# Patient Record
Sex: FEMALE | Race: WHITE | Employment: FULL TIME
[De-identification: names, ages, dates, MRNs, and addresses within clinical notes are randomized per-mention and may not be internally consistent; named-entity substitution may affect disease eponyms.]

---

## 2017-06-17 ENCOUNTER — HEALTH MAINTENANCE LETTER (OUTPATIENT)
Age: 52
End: 2017-06-17

## 2018-02-27 ENCOUNTER — HOSPITAL ENCOUNTER (OUTPATIENT)
Dept: MAMMOGRAPHY | Facility: CLINIC | Age: 53
Discharge: HOME | End: 2018-02-27
Attending: OBSTETRICS & GYNECOLOGY | Admitting: OBSTETRICS & GYNECOLOGY
Payer: COMMERCIAL

## 2018-02-27 DIAGNOSIS — Z12.31 VISIT FOR SCREENING MAMMOGRAM: ICD-10-CM

## 2018-02-27 PROCEDURE — 77067 SCR MAMMO BI INCL CAD: CPT

## 2018-04-06 ENCOUNTER — TRANSFERRED RECORDS (OUTPATIENT)
Dept: HEALTH INFORMATION MANAGEMENT | Facility: CLINIC | Age: 53
End: 2018-04-06

## 2018-04-24 ENCOUNTER — OFFICE VISIT (OUTPATIENT)
Dept: SURGERY | Facility: CLINIC | Age: 53
End: 2018-04-24
Payer: COMMERCIAL

## 2018-04-24 VITALS
WEIGHT: 158 LBS | BODY MASS INDEX: 23.95 KG/M2 | HEIGHT: 68 IN | SYSTOLIC BLOOD PRESSURE: 112 MMHG | HEART RATE: 64 BPM | DIASTOLIC BLOOD PRESSURE: 76 MMHG

## 2018-04-24 DIAGNOSIS — N60.02 CYST (SOLITARY) OF BREAST, LEFT: Primary | ICD-10-CM

## 2018-04-24 LAB
GRAM STN SPEC: NORMAL
GRAM STN SPEC: NORMAL
SPECIMEN SOURCE: NORMAL

## 2018-04-24 PROCEDURE — 10060 I&D ABSCESS SIMPLE/SINGLE: CPT | Performed by: SURGERY

## 2018-04-24 PROCEDURE — 87205 SMEAR GRAM STAIN: CPT | Performed by: SURGERY

## 2018-04-24 PROCEDURE — 87070 CULTURE OTHR SPECIMN AEROBIC: CPT | Performed by: SURGERY

## 2018-04-24 PROCEDURE — 99204 OFFICE O/P NEW MOD 45 MIN: CPT | Mod: 25 | Performed by: SURGERY

## 2018-04-24 NOTE — PROGRESS NOTES
[facility] Breast Surgery Consultation    HPI:   Laurylee Duscher is a 52 year old female who is seen in consultation at the request of Dr. Capellan for evaluation of left breast erythema and fluctuance. Laurylee reports that at the end of March she noticed a spot on her left upper outer breast that was red and about the size of a pea. She saw Dr. Capellan and was started on keflex. The area got bigger and more erythematous and she was then placed on Augmentin which she finished approximately 7 days ago. The redness improved but there was still a tender spot that was red. She had a screening mammogram Februrary 2018 which was negative. She recently returned from a cruise in the [facility].     Hormonal history:  Post menopausal, hysterectomy and oophorectomy in 2010,  on HRT, no fertility treatment.  2 children, 1st at age 23, menarche 15yo.    Family history of breast cancer: Yes - maternal aunt at 39yo  Family history of ovarian cancer:  No  Family history of colon cancer: Yes - mother in her late 70s  Family history of prostate cancer: Yes - father in his 70s and brother in his 60s    Imaging:     Reviewed mammogram from 2/2018 - negative per report and my review    Percutaneous core needle biopsy, none:       Past Medical History:   has a past medical history of Asthma; Endometriosis; Family history of heart disease; Hyperlipidemia; Ovarian cyst; and Pleurisy (1990).      Current Outpatient Prescriptions:      estradiol (VIVELLE-DOT) 0.05 MG/24HR patch, Place 1 patch onto the skin twice a week, Disp: , Rfl:      Ibuprofen (ADVIL PO), Take by mouth every 8 hours as needed for moderate pain, Disp: , Rfl:      Multiple Vitamins-Minerals (MULTIVITAMIN PO), Take by mouth daily, Disp: , Rfl:      Omega-3 Fatty Acids (OMEGA-3 FISH OIL PO), Take by mouth daily, Disp: , Rfl:      varenicline (CHANTIX STARTING MONTH PAK) 0.5 MG X 11 & 1 MG X 42 tablet, Take 0.5 mg for 4days, then increase to 0.5mg twice a day for days 4-7,  "then 1mg twice daily for 11 weeks. (Patient not taking: Reported on 4/24/2018), Disp: 53 tablet, Rfl: 0     varenicline (CHANTIX) 1 MG tablet, Take 1 tablet (1 mg) by mouth 2 times daily (Patient not taking: Reported on 4/24/2018), Disp: 120 tablet, Rfl: 0    Past Surgical History:  Past Surgical History:   Procedure Laterality Date     APPENDECTOMY  1977     GYN SURGERY      hysterectomy     HYSTERECTOMY  2010    [facility]RIE RSO     TONSILLECTOMY  1976         No Known Allergies      Social History:  Social History     Social History     Marital status:      Spouse name: N/A     Number of children: N/A     Years of education: N/A     Occupational History     Not on file.     Social History Main Topics     Smoking status: Current Every Day Smoker     Packs/day: 1.00     Years: 25.00     Types: Cigarettes     Smokeless tobacco: Never Used     Alcohol use 0.0 oz/week     0 Standard drinks or equivalent per week      Comment: 2-6 drinks, beer, wine, 2 days week     Drug use: Not on file     Sexual activity: Not on file     Other Topics Concern     Caffeine Concern Yes     1 cups coffee per day     Sleep Concern Yes     Stress Concern No     Weight Concern Yes     weight gain     Special Diet Yes     started medifast last week     Exercise Yes     bowling     Social History Narrative        ROS:  The 10 point review of systems is negative other than noted in the HPI and above.    PE:  Vitals: /76  Pulse 64  Ht 5' 8\" (1.727 m)  Wt 158 lb (71.7 kg)  BMI 24.02 kg/m2  General appearance: well-nourished, sitting comfortably, no apparent distress  Psych: normal affect, pleasant  HEENT:  Head normocephalic and atraumatic, pupils equal and round, conjunctivae clear, mucous membranes moist, external ears and nose normal  Neck: Supple without thyromegaly or masses  Lungs: Respirations unlabored  Lymphatic: No cervical, or supraclavicular lymphadenopathy  Extremities: Without edema  Musculoskeletal:  Normal station and " gait  Neurologic: nonfocal, grossly intact times four extremities, alert and oriented times three  Psychiatric: Mood and affect are appropriate  Skin: Without lesions or rashes    Breast:  A bilateral breast exam was performed in the supine position.. Bilateral breasts were palpated in a circumferential clockwise fashion including the supraclavicular and axillary areas.     2cm erythematous skin lesion on the upper outer left breast, near the axilla with fluctuance present, open pore in skin consistent with infected epidermoid cyst. Remainder of breast exam normal, no masses. Nipple/areola normal bilaterally.     Lymph:       No supraclavicular/infraclavicular adenopathy.   Axillary adenopathy: none    Procedure: The left breast skin lesion was prepped with chlorhexidine. 1% lidocaine with epinephrine was injected. A 15 blade was used to make an incision over the area of fluctuance. Cheesy exudate consistent with epidermoid cyst material was evacuated. This was cultured. The wound was irrigated. It was then packed with 1/4 in packing gauze and covered with a sterile dressing.     Assessment/Plan:  Left breast skin lesion clinically consistent with epidermoid cyst. Drained in clinic today. Pt should follow up w me in 2 weeks and we will plan for definitive excision of the cyst in 4-6 weeks under local anesthetic. No need for further abx at this time. Remove packing tomorrow and cover with clean dry dressing.       Chanda Iglesias MD      Please route or send letter to:  Primary Care Provider (PCP) and Referring Provider

## 2018-04-24 NOTE — MR AVS SNAPSHOT
"              After Visit Summary   2018    Laurylee Duscher    MRN: [id number]           Patient Information     Date Of Birth          1965        Visit Information        Provider Department      2018 9:30 AM Chanda Iglesias MD [facility] General Surgery      Today's Diagnoses     Cyst (solitary) of breast, left    -  1       Follow-ups after your visit        Who to contact     If you have questions or need follow up information about today's clinic visit or your schedule please contact [facility] directly at [phone].  Normal or non-critical lab and imaging results will be communicated to you by [employer], letter or phone within 4 business days after the clinic has received the results. If you do not hear from us within 7 days, please contact the clinic through FairShare[employer] or phone. If you have a critical or abnormal lab result, we will notify you by phone as soon as possible.  Submit refill requests through frenting or call your pharmacy and they will forward the refill request to us. Please allow 3 business days for your refill to be completed.          Additional Information About Your Visit        MyChart Information     frenting lets you send messages to your doctor, view your test results, renew your prescriptions, schedule appointments and more. To sign up, go to [email or web address] . Click on \"Log in\" on the left side of the screen, which will take you to the Welcome page. Then click on \"Sign up Now\" on the right side of the page.     You will be asked to enter the access code listed below, as well as some personal information. Please follow the directions to create your username and password.     Your access code is: [email or web address]  Expires: 2018 10:13 AM     Your access code will  in 90 days. If you need help or a new code, please call your [place] clinic or [phone].        Care EveryWhere ID     This is " "your Care EveryWhere ID. This could be used by other organizations to access your [place] medical records  [id number]        Your Vitals Were     Pulse Height BMI (Body Mass Index)             64 5' 8\" (1.727 m) 24.02 kg/m2          Blood Pressure from Last 3 Encounters:   04/24/18 112/76   05/19/16 113/78   11/17/15 118/82    Weight from Last 3 Encounters:   04/24/18 158 lb (71.7 kg)   05/19/16 158 lb (71.7 kg)   11/17/15 164 lb 12.8 oz (74.8 kg)              We Performed the Following     Gram stain     Wound Culture Aerobic Bacterial          Today's Medication Changes          These changes are accurate as of 4/24/18 10:13 AM.  If you have any questions, ask your nurse or doctor.               Stop taking these medicines if you haven't already. Please contact your care team if you have questions.     oxyCODONE IR 5 MG tablet   Commonly known as:  ROXICODONE   Stopped by:  Chanda Iglesias MD                    Primary Care Provider Office Phone # Fax #    Jeanne Capellan [phone]       [facility] [address]  [facility] [address]        Equal Access to Services     GABI ABEBE AH: Hadii lillian tenorioo Sorojelio, waaxda luqadaha, qaybta kaalmada adedialloyada, cecelia abel. So [facility] [phone].    ATENCIÓN: Si habla español, tiene a dominguez disposición servicios gratuitos de asistencia lingüística. Llame al [phone].    We comply with applicable federal civil rights laws and [place] laws. We do not discriminate on the basis of race, color, national origin, age, disability, sex, sexual orientation, or gender identity.            Thank you!     Thank you for choosing [facility]  for your care. Our goal is always to provide you with excellent care. Hearing back from our patients is one way we can continue to improve our services. Please take a few minutes to complete the written survey that you may receive in the mail after your visit " with us. Thank you!             Your Updated Medication List - Protect others around you: Learn how to safely use, store and throw away your medicines at www.disposemymeds.org.          This list is accurate as of 4/24/18 10:13 AM.  Always use your most recent med list.                   Brand Name Dispense Instructions for use Diagnosis    ADVIL PO      Take by mouth every 8 hours as needed for moderate pain        estradiol 0.05 MG/24HR BIW patch    VIVELLE-DOT     Place 1 patch onto the skin twice a week        MULTIVITAMIN PO      Take by mouth daily        OMEGA-3 FISH OIL PO      Take by mouth daily        * varenicline 0.5 MG X 11 & 1 MG X 42 tablet    CHANTIX STARTING MONTH LEONARDA    53 tablet    Take 0.5 mg for 4days, then increase to 0.5mg twice a day for days 4-7, then 1mg twice daily for 11 weeks.    Tobacco abuse       * varenicline 1 MG tablet    CHANTIX    120 tablet    Take 1 tablet (1 mg) by mouth 2 times daily    Tobacco abuse       * Notice:  This list has 2 medication(s) that are the same as other medications prescribed for you. Read the directions carefully, and ask your doctor or other care provider to review them with you.

## 2018-04-24 NOTE — LETTER
[facility] Breast Surgery Consultation    2018    Re: Laurylee Duscher, : 1965     HPI:  Laurylee Duscher is a 52 year old female who is seen in consultation at the request of Dr. Capellan for evaluation of left breast erythema and fluctuance. Laurylee reports that at the end of March she noticed a spot on her left upper outer breast that was red and about the size of a pea. She saw Dr. Capellan and was started on keflex. The area got bigger and more erythematous and she was then placed on Augmentin which she finished approximately 7 days ago. The redness improved but there was still a tender spot that was red. She had a screening mammogram 2018 which was negative. She recently returned from a cruise in the [facility].      Hormonal history:  Post menopausal, hysterectomy and oophorectomy in ,  on HRT, no fertility treatment.  2 children, 1st at age 23, menarche 15yo.     Family history of breast cancer: Yes - maternal aunt at 37yo  Family history of ovarian cancer:  No  Family history of colon cancer: Yes - mother in her late 70s  Family history of prostate cancer: Yes - father in his 70s and brother in his 60s     Imaging:      Reviewed mammogram from 2018 - negative per report and my review     Percutaneous core needle biopsy, none:      Past Medical History:  has a past medical history of Asthma; Endometriosis; Family history of heart disease; Hyperlipidemia; Ovarian cyst; and Pleurisy ().     Current Outpatient Prescriptions:      estradiol (VIVELLE-DOT) 0.05 MG/24HR patch, Place 1 patch onto the skin twice a week, Disp: , Rfl:      Ibuprofen (ADVIL PO), Take by mouth every 8 hours as needed for moderate pain, Disp: , Rfl:      Multiple Vitamins-Minerals (MULTIVITAMIN PO), Take by mouth daily, Disp: , Rfl:      Omega-3 Fatty Acids (OMEGA-3 FISH OIL PO), Take by mouth daily, Disp: , Rfl:      varenicline (CHANTIX STARTING MONTH LEONARDA) 0.5 MG X 11 & 1 MG X 42 tablet, Take 0.5  "mg for 4days, then increase to 0.5mg twice a day for days 4-7, then 1mg twice daily for 11 weeks. (Patient not taking: Reported on 4/24/2018), Disp: 53 tablet, Rfl: 0     varenicline (CHANTIX) 1 MG tablet, Take 1 tablet (1 mg) by mouth 2 times daily (Patient not taking: Reported on 4/24/2018), Disp: 120 tablet, Rfl: 0    PE:  Vitals: /76  Pulse 64  Ht 5' 8\" (1.727 m)  Wt 158 lb (71.7 kg)  BMI 24.02 kg/m2  General appearance: well-nourished, sitting comfortably, no apparent distress  Psych: normal affect, pleasant  HEENT:  Head normocephalic and atraumatic, pupils equal and round, conjunctivae clear, mucous membranes moist, external ears and nose normal  Neck: Supple without thyromegaly or masses  Lungs: Respirations unlabored  Lymphatic: No cervical, or supraclavicular lymphadenopathy  Extremities: Without edema  Musculoskeletal:  Normal station and gait  Neurologic: nonfocal, grossly intact times four extremities, alert and oriented times three  Psychiatric: Mood and affect are appropriate  Skin: Without lesions or rashes     Breast:  A bilateral breast exam was performed in the supine position.. Bilateral breasts were palpated in a circumferential clockwise fashion including the supraclavicular and axillary areas.      2cm erythematous skin lesion on the upper outer left breast, near the axilla with fluctuance present, open pore in skin consistent with infected epidermoid cyst. Remainder of breast exam normal, no masses. Nipple/areola normal bilaterally.      Lymph:                                             No supraclavicular/infraclavicular adenopathy.                        Axillary adenopathy: none     Procedure: The left breast skin lesion was prepped with chlorhexidine. 1% lidocaine with epinephrine was injected. A 15 blade was used to make an incision over the area of fluctuance. Cheesy exudate consistent with epidermoid cyst material was evacuated. This was cultured. The wound was irrigated. It " was then packed with 1/4 in packing gauze and covered with a sterile dressing.      Assessment/Plan:  Left breast skin lesion clinically consistent with epidermoid cyst. Drained in clinic today. Pt should follow up w me in 2 weeks and we will plan for definitive excision of the cyst in 4-6 weeks under local anesthetic. No need for further abx at this time. Remove packing tomorrow and cover with clean dry dressing.         Chanda Iglesias MD

## 2018-04-26 ENCOUNTER — TELEPHONE (OUTPATIENT)
Dept: SURGERY | Facility: CLINIC | Age: 53
End: 2018-04-26

## 2018-04-26 LAB
BACTERIA SPEC CULT: NO GROWTH
SPECIMEN SOURCE: NORMAL

## 2018-04-26 NOTE — TELEPHONE ENCOUNTER
Patient notified, per Dr. Iglesias's progress note, no need for further abx at this time. Patient verbalized understanding.    Encouraged patient to keep f/u with [facility] as scheduled and call us in the interim with additional questions/concerns. Patient verbalized understanding.    Florence Jones RN, BSN  Nurse Navigator   [facility]  [phone]

## 2018-04-26 NOTE — TELEPHONE ENCOUNTER
Name of caller: Patient    Reason for Call:  Pt was told to continued antibiotics, did not receive any and was never prescribed.    Surgeon:  Dr. Iglesias    Recent Surgery:  No    If yes, when & what type:  N/A      Best phone number to reach pt at is: [phone]  Ok to leave a message with medical info? Yes.    Pharmacy preferred (if calling for a refill): [facility] [phone]

## 2018-05-11 ENCOUNTER — OFFICE VISIT (OUTPATIENT)
Dept: SURGERY | Facility: CLINIC | Age: 53
End: 2018-05-11
Payer: COMMERCIAL

## 2018-05-11 ENCOUNTER — TELEPHONE (OUTPATIENT)
Dept: SURGERY | Facility: CLINIC | Age: 53
End: 2018-05-11

## 2018-05-11 VITALS — HEIGHT: 68 IN | WEIGHT: 158 LBS | BODY MASS INDEX: 23.95 KG/M2

## 2018-05-11 DIAGNOSIS — L72.3 SEBACEOUS CYST: Primary | ICD-10-CM

## 2018-05-11 PROCEDURE — 99214 OFFICE O/P EST MOD 30 MIN: CPT | Performed by: SURGERY

## 2018-05-11 NOTE — PROGRESS NOTES
Surgery Follow Up Note    S: Laurylee returns to recheck her left breast sebaceous cyst wound. It has healed well, no infection. No pain.     Left breast: previous location of I&D healed well. There is sebaceous material visible within wound.     A/P  Laurylee Duscher has a left breast skin cyst. She will benefit from formal excision. We will do this in endoscopy on Monday.     Thank you for the opportunity to help in her care.    Chanda Iglesias  [facility]  [phone]    Please route or send letter to:  Primary Care Provider (PCP) and Referring Provider

## 2018-05-11 NOTE — MR AVS SNAPSHOT
"              After Visit Summary   5/11/2018    Laurylee Duscher    MRN: [id number]           Patient Information     Date Of Birth          1965        Visit Information        Provider Department      5/11/2018 4:00 PM Chanda Iglesias MD [facility] General Surgery       Follow-ups after your visit        Your next 10 appointments already scheduled     May 14, 2018   Procedure with Chanda Iglesias MD   [facility] ([facility])    [address]  [place] [id number] [phone]           [facility] is located at [address] [place]              Who to contact     If you have questions or need follow up information about today's clinic visit or your schedule please contact [facility] directly at [phone].  Normal or non-critical lab and imaging results will be communicated to you by MyChart, letter or phone within 4 business days after the clinic has received the results. If you do not hear from us within 7 days, please contact the clinic through MyChart or phone. If you have a critical or abnormal lab result, we will notify you by phone as soon as possible.  Submit refill requests through [employer] or call your pharmacy and they will forward the refill request to us. Please allow 3 business days for your refill to be completed.          Additional Information About Your Visit        MyChart Information     [employer] lets you send messages to your doctor, view your test results, renew your prescriptions, schedule appointments and more. To sign up, go to [email or web address] . Click on \"Log in\" on the left side of the screen, which will take you to the Welcome page. Then click on \"Sign up Now\" on the right side of the page.     You will be asked to enter the access code listed below, as well as some personal information. Please follow the directions to create your username and " "password.     Your access code is: [email or web address]  Expires: 2018 10:13 AM     Your access code will  in 90 days. If you need help or a new code, please call your [facility] or [phone].        Care EveryWhere ID     This is your Care EveryWhere ID. This could be used by other organizations to access your [place] medical records  [id number]        Your Vitals Were     Height BMI (Body Mass Index)                5' 8\" (1.727 m) 24.02 kg/m2           Blood Pressure from Last 3 Encounters:   18 112/76   16 113/78   11/17/15 118/82    Weight from Last 3 Encounters:   18 158 lb (71.7 kg)   18 158 lb (71.7 kg)   16 158 lb (71.7 kg)              Today, you had the following     No orders found for display       Primary Care Provider Office Phone # Fax #    Jeanne Capellan [phone]       [facility] [address]  [facility] [address]        Equal Access to Services     [facility]: Hadii aad ku hadasho Soomaali, waaxda luqadaha, qaybta kaalmada adeegyada, cecelia braga . So [facility] [phone].    ATENCIÓN: Si habla español, tiene a dominguez disposición servicios gratuitos de asistencia lingüística. Llame al [phone].    We comply with applicable federal civil rights laws and [place] laws. We do not discriminate on the basis of race, color, national origin, age, disability, sex, sexual orientation, or gender identity.            Thank you!     Thank you for choosing [facility]  for your care. Our goal is always to provide you with excellent care. Hearing back from our patients is one way we can continue to improve our services. Please take a few minutes to complete the written survey that you may receive in the mail after your visit with us. Thank you!             Your Updated Medication List - Protect others around you: Learn how to safely use, store and throw away your medicines at " www.disposemymeds.org.          This list is accurate as of 5/11/18  4:06 PM.  Always use your most recent med list.                   Brand Name Dispense Instructions for use Diagnosis    ADVIL PO      Take by mouth every 8 hours as needed for moderate pain        estradiol 0.05 MG/24HR BIW patch    VIVELLE-DOT     Place 1 patch onto the skin twice a week        MULTIVITAMIN PO      Take by mouth daily        OMEGA-3 FISH OIL PO      Take by mouth daily        * varenicline 0.5 MG X 11 & 1 MG X 42 tablet    CHANTIX STARTING MONTH LEONARDA    53 tablet    Take 0.5 mg for 4days, then increase to 0.5mg twice a day for days 4-7, then 1mg twice daily for 11 weeks.    Tobacco abuse       * varenicline 1 MG tablet    CHANTIX    120 tablet    Take 1 tablet (1 mg) by mouth 2 times daily    Tobacco abuse       * Notice:  This list has 2 medication(s) that are the same as other medications prescribed for you. Read the directions carefully, and ask your doctor or other care provider to review them with you.

## 2018-05-11 NOTE — LETTER
May 11, 2018    Re: Laurylee Duscher, : 1965    Surgery Follow Up Note     S: Laurylee returns to recheck her left breast sebaceous cyst wound. It has healed well, no infection. No pain.      Left breast: previous location of I&D healed well. There is sebaceous material visible within wound.      A/P  Laurylee Duscher has a left breast skin cyst. She will benefit from formal excision. We will do this in endoscopy on Monday.      Thank you for the opportunity to help in her care.     Chanda Iglesias  [facility]  [phone]

## 2018-05-11 NOTE — TELEPHONE ENCOUNTER
Type of surgery: Excision left breast skin cyst  Location of surgery: [facility]  Date and time of surgery: 5/14/18 at 3:30pm  Surgeon: Dr. Chanda Iglesias  Pre-Op Appt Date: Patient to schedule  Post-Op Appt Date: Patient to schedule   Packet sent out: Yes  Pre-cert/Authorization completed:  Not Applicable  Date: 5/11/18

## 2018-05-14 ENCOUNTER — HOSPITAL ENCOUNTER (OUTPATIENT)
Facility: CLINIC | Age: 53
Discharge: HOME | End: 2018-05-14
Attending: SURGERY | Admitting: SURGERY
Payer: COMMERCIAL

## 2018-05-14 ENCOUNTER — OFFICE VISIT (OUTPATIENT)
Dept: SURGERY | Facility: PHYSICIAN GROUP | Age: 53
End: 2018-05-14
Payer: COMMERCIAL

## 2018-05-14 VITALS
WEIGHT: 158 LBS | HEIGHT: 68 IN | SYSTOLIC BLOOD PRESSURE: 110 MMHG | BODY MASS INDEX: 23.95 KG/M2 | OXYGEN SATURATION: 96 % | DIASTOLIC BLOOD PRESSURE: 79 MMHG

## 2018-05-14 PROCEDURE — 12031 INTMD RPR S/A/T/EXT 2.5 CM/<: CPT

## 2018-05-14 PROCEDURE — 88305 TISSUE EXAM BY PATHOLOGIST: CPT | Performed by: SURGERY

## 2018-05-14 PROCEDURE — 11402 EXC TR-EXT B9+MARG 1.1-2 CM: CPT | Performed by: SURGERY

## 2018-05-14 PROCEDURE — 12031 INTMD RPR S/A/T/EXT 2.5 CM/<: CPT | Performed by: SURGERY

## 2018-05-14 PROCEDURE — 11401 EXC TR-EXT B9+MARG 0.6-1 CM: CPT | Performed by: SURGERY

## 2018-05-14 PROCEDURE — 19120 REMOVAL OF BREAST LESION: CPT | Performed by: SURGERY

## 2018-05-14 PROCEDURE — 88305 TISSUE EXAM BY PATHOLOGIST: CPT | Mod: 26 | Performed by: SURGERY

## 2018-05-14 RX ORDER — LIDOCAINE HYDROCHLORIDE 10 MG/ML
10 INJECTION, SOLUTION EPIDURAL; INFILTRATION; INTRACAUDAL; PERINEURAL ONCE
Status: DISCONTINUED | OUTPATIENT
Start: 2018-05-14 | End: 2018-05-16 | Stop reason: HOSPADM

## 2018-05-16 ENCOUNTER — TELEPHONE (OUTPATIENT)
Dept: SURGERY | Facility: CLINIC | Age: 53
End: 2018-05-16

## 2018-05-16 LAB — COPATH REPORT: NORMAL

## 2018-05-16 NOTE — TELEPHONE ENCOUNTER
I called Laurylee with her pathology results from the skin excision on her left breast. It was a benign lesion consistent with epidermoid inclusion cyst. She should call with questions or concerns.     Chanda Iglesias MD  [facility]  [phone]

## 2018-05-17 ENCOUNTER — TELEPHONE (OUTPATIENT)
Dept: SURGERY | Facility: CLINIC | Age: 53
End: 2018-05-17

## 2018-05-17 NOTE — TELEPHONE ENCOUNTER
I spoke with patient this afternoon and she was just asking if I could tell her what Dr. Iglesias said in her voicemail message she left yesterday.  Patient states she accidentally deleted the message.  I informed patient of Dr. Iglesias's message below:    I called Laurylee with her pathology results from the skin excision on her left breast. It was a benign lesion consistent with epidermoid inclusion cyst. She should call with questions or concerns.      Chanda Iglesias MD  [facility]  [phone]      I also informed patient to continue to monitor the incision for any signs/symptoms of infection, and if any concerns or questions arise, she can call us back.  Patient verbalized understanding.  EARLINE RiveraN, RN

## 2018-05-17 NOTE — TELEPHONE ENCOUNTER
Name of caller: Patient    Reason for Call:  Pt received call from Dr Iglesias and patient deleted the voicemail by mistake.    Surgeon:  Dr. Iglesias    Recent Surgery:  Yes.    If yes, when & what type:  EXCISION LEFT BREAST SKIN CYST 5/14      Best phone number to reach pt at is: [phone]   Ok to leave a message with medical info? Yes.    Pharmacy preferred (if calling for a refill): n/a

## 2018-05-17 NOTE — OP NOTE
General Surgery Operative Note - Minors Procedure    Pre-Operative Diagnosis- left breast skin lesion    Post-Operative Diagnosis- same    Procedure- excision of left breast skin mass    Surgeon- Chanda Iglesias MD    Assistant- None    Anesthesia- 1% lidocaine with epi    Specimen-left breast skin mass    Procedure  Consent was obtained. A surgical time out was performed. The patient was positioned supine. The patient was prepped and draped in the usual sterile fashion with Chloraprep. Using sterile technique, 1% lidocaine with epinephrine was used to infiltrate the area. After adequate anesthesia was confirmed, a number 15 scalpel was used to make an elliptical incision around the lesion. Using sharp dissection a 1.5 cm x 1.5cm mass consistent with epidermoid cyst was found and circumferentially freed.  It was removed in its entirety. There was no bleeding and the wound bed was dry.   The skin was closed in multiple layers with a 3-0 vicryl and 4-0 monocryl. Steri Strips and a sterile dressing were placed.  Patient tolerated the procedure well without complications. All sponge, instrument, and needle counts were correct at the conclusion of the case.      Chanda Iglesias MD  [facility]  [phone]    Please route or send letter to:  Primary Care Provider (PCP) and Referring Provider

## 2019-03-12 ENCOUNTER — HOSPITAL ENCOUNTER (OUTPATIENT)
Dept: MAMMOGRAPHY | Facility: CLINIC | Age: 54
Discharge: HOME | End: 2019-03-12
Attending: OBSTETRICS & GYNECOLOGY | Admitting: OBSTETRICS & GYNECOLOGY
Payer: COMMERCIAL

## 2019-03-12 DIAGNOSIS — Z12.31 VISIT FOR SCREENING MAMMOGRAM: ICD-10-CM

## 2019-03-12 PROCEDURE — 77063 BREAST TOMOSYNTHESIS BI: CPT

## 2019-10-01 ENCOUNTER — HEALTH MAINTENANCE LETTER (OUTPATIENT)
Age: 54
End: 2019-10-01

## 2021-01-15 ENCOUNTER — HEALTH MAINTENANCE LETTER (OUTPATIENT)
Age: 56
End: 2021-01-15

## 2021-05-15 ENCOUNTER — HEALTH MAINTENANCE LETTER (OUTPATIENT)
Age: 56
End: 2021-05-15

## 2021-09-04 ENCOUNTER — HEALTH MAINTENANCE LETTER (OUTPATIENT)
Age: 56
End: 2021-09-04

## 2022-02-19 ENCOUNTER — HEALTH MAINTENANCE LETTER (OUTPATIENT)
Age: 57
End: 2022-02-19

## 2022-10-16 ENCOUNTER — HEALTH MAINTENANCE LETTER (OUTPATIENT)
Age: 57
End: 2022-10-16

## 2023-06-01 ENCOUNTER — HEALTH MAINTENANCE LETTER (OUTPATIENT)
Age: 58
End: 2023-06-01

## 2025-06-10 ENCOUNTER — APPOINTMENT (OUTPATIENT)
Dept: CT IMAGING | Facility: CLINIC | Age: 60
End: 2025-06-10
Attending: EMERGENCY MEDICINE
Payer: COMMERCIAL

## 2025-06-10 ENCOUNTER — HOSPITAL ENCOUNTER (EMERGENCY)
Facility: CLINIC | Age: 60
Discharge: HOME | End: 2025-06-10
Attending: EMERGENCY MEDICINE | Admitting: EMERGENCY MEDICINE
Payer: COMMERCIAL

## 2025-06-10 VITALS
HEIGHT: 64 IN | HEART RATE: 85 BPM | TEMPERATURE: 98 F | OXYGEN SATURATION: 98 % | SYSTOLIC BLOOD PRESSURE: 103 MMHG | DIASTOLIC BLOOD PRESSURE: 71 MMHG | BODY MASS INDEX: 22.2 KG/M2 | RESPIRATION RATE: 16 BRPM | WEIGHT: 130 LBS

## 2025-06-10 DIAGNOSIS — K52.9 COLITIS: ICD-10-CM

## 2025-06-10 LAB
ALBUMIN SERPL BCG-MCNC: 4.1 G/DL (ref 3.5–5.2)
ALP SERPL-CCNC: 56 U/L (ref 40–150)
ALT SERPL W P-5'-P-CCNC: 13 U/L (ref 0–50)
ANION GAP SERPL CALCULATED.3IONS-SCNC: 12 MMOL/L (ref 7–15)
AST SERPL W P-5'-P-CCNC: 15 U/L (ref 0–45)
BASOPHILS # BLD AUTO: 0.1 10E3/UL (ref 0–0.2)
BASOPHILS NFR BLD AUTO: 1 %
BILIRUB SERPL-MCNC: 0.6 MG/DL
BUN SERPL-MCNC: 8.3 MG/DL (ref 8–23)
CALCIUM SERPL-MCNC: 9.4 MG/DL (ref 8.8–10.4)
CHLORIDE SERPL-SCNC: 103 MMOL/L (ref 98–107)
CREAT SERPL-MCNC: 0.7 MG/DL (ref 0.51–0.95)
EGFRCR SERPLBLD CKD-EPI 2021: >90 ML/MIN/1.73M2
EOSINOPHIL # BLD AUTO: 0.1 10E3/UL (ref 0–0.7)
EOSINOPHIL NFR BLD AUTO: 1 %
ERYTHROCYTE [DISTWIDTH] IN BLOOD BY AUTOMATED COUNT: 13.5 % (ref 10–15)
GLUCOSE SERPL-MCNC: 96 MG/DL (ref 70–99)
HCO3 SERPL-SCNC: 23 MMOL/L (ref 22–29)
HCT VFR BLD AUTO: 40.1 % (ref 35–47)
HGB BLD-MCNC: 14.2 G/DL (ref 11.7–15.7)
IMM GRANULOCYTES # BLD: 0 10E3/UL
IMM GRANULOCYTES NFR BLD: 0 %
LYMPHOCYTES # BLD AUTO: 2.9 10E3/UL (ref 0.8–5.3)
LYMPHOCYTES NFR BLD AUTO: 26 %
MCH RBC QN AUTO: 31.4 PG (ref 26.5–33)
MCHC RBC AUTO-ENTMCNC: 35.4 G/DL (ref 31.5–36.5)
MCV RBC AUTO: 89 FL (ref 78–100)
MONOCYTES # BLD AUTO: 0.8 10E3/UL (ref 0–1.3)
MONOCYTES NFR BLD AUTO: 7 %
NEUTROPHILS # BLD AUTO: 7.4 10E3/UL (ref 1.6–8.3)
NEUTROPHILS NFR BLD AUTO: 65 %
NRBC # BLD AUTO: 0 10E3/UL
NRBC BLD AUTO-RTO: 0 /100
PLATELET # BLD AUTO: 295 10E3/UL (ref 150–450)
POTASSIUM SERPL-SCNC: 3.9 MMOL/L (ref 3.4–5.3)
PROT SERPL-MCNC: 6.5 G/DL (ref 6.4–8.3)
RBC # BLD AUTO: 4.52 10E6/UL (ref 3.8–5.2)
SODIUM SERPL-SCNC: 138 MMOL/L (ref 135–145)
WBC # BLD AUTO: 11.4 10E3/UL (ref 4–11)

## 2025-06-10 PROCEDURE — 250N000011 HC RX IP 250 OP 636: Performed by: EMERGENCY MEDICINE

## 2025-06-10 PROCEDURE — 36415 COLL VENOUS BLD VENIPUNCTURE: CPT | Performed by: EMERGENCY MEDICINE

## 2025-06-10 PROCEDURE — 99285 EMERGENCY DEPT VISIT HI MDM: CPT | Mod: 25

## 2025-06-10 PROCEDURE — 250N000009 HC RX 250: Performed by: EMERGENCY MEDICINE

## 2025-06-10 PROCEDURE — 85025 COMPLETE CBC W/AUTO DIFF WBC: CPT | Performed by: EMERGENCY MEDICINE

## 2025-06-10 PROCEDURE — 82435 ASSAY OF BLOOD CHLORIDE: CPT | Performed by: EMERGENCY MEDICINE

## 2025-06-10 PROCEDURE — 250N000013 HC RX MED GY IP 250 OP 250 PS 637: Performed by: EMERGENCY MEDICINE

## 2025-06-10 PROCEDURE — 74177 CT ABD & PELVIS W/CONTRAST: CPT

## 2025-06-10 RX ORDER — ONDANSETRON 4 MG/1
4 TABLET, ORALLY DISINTEGRATING ORAL EVERY 8 HOURS PRN
Qty: 6 TABLET | Refills: 0 | Status: SHIPPED | OUTPATIENT
Start: 2025-06-10

## 2025-06-10 RX ORDER — OXYCODONE HYDROCHLORIDE 5 MG/1
5 TABLET ORAL ONCE
Refills: 0 | Status: COMPLETED | OUTPATIENT
Start: 2025-06-10 | End: 2025-06-10

## 2025-06-10 RX ORDER — OXYCODONE HYDROCHLORIDE 5 MG/1
5 TABLET ORAL EVERY 6 HOURS PRN
Qty: 3 TABLET | Refills: 0 | Status: SHIPPED | OUTPATIENT
Start: 2025-06-10

## 2025-06-10 RX ORDER — IOPAMIDOL 755 MG/ML
65 INJECTION, SOLUTION INTRAVASCULAR ONCE
Status: COMPLETED | OUTPATIENT
Start: 2025-06-10 | End: 2025-06-10

## 2025-06-10 RX ADMIN — IOPAMIDOL 65 ML: 755 INJECTION, SOLUTION INTRAVENOUS at 13:28

## 2025-06-10 RX ADMIN — SODIUM CHLORIDE 60 ML: 9 INJECTION, SOLUTION INTRAVENOUS at 13:28

## 2025-06-10 RX ADMIN — OXYCODONE HYDROCHLORIDE 5 MG: 5 TABLET ORAL at 13:06

## 2025-06-10 ASSESSMENT — COLUMBIA-SUICIDE SEVERITY RATING SCALE - C-SSRS
2. HAVE YOU ACTUALLY HAD ANY THOUGHTS OF KILLING YOURSELF IN THE PAST MONTH?: NO
1. IN THE PAST MONTH, HAVE YOU WISHED YOU WERE DEAD OR WISHED YOU COULD GO TO SLEEP AND NOT WAKE UP?: NO
6. HAVE YOU EVER DONE ANYTHING, STARTED TO DO ANYTHING, OR PREPARED TO DO ANYTHING TO END YOUR LIFE?: NO

## 2025-06-10 ASSESSMENT — ACTIVITIES OF DAILY LIVING (ADL)
ADLS_ACUITY_SCORE: 41

## 2025-06-10 NOTE — ED PROVIDER NOTES
"  Emergency Department Note      History of Present Illness     Chief Complaint   Abdominal Pain      HPI   Laurylee Duscher is a 59 year old female with a history of hyperlipidemia and total hysterectomy who presents to the ED for evaluation of abdominal pain. Patient reports that she woke up at 0400 yesterday morning with lower abdominal pain. She went to the bathroom and had a normal bowel movement. Shortly afterward she had another bowel movement that was softer and lighter in color. The next time she had a bowel movement there was blood and very little stool. Her most recent episode of hematochezia was at 1100 today. The abdominal pain comes and goes in waves, and bowel movements do not affect it. She denies vomiting, nausea, dysuria, hematuria, chest pain, shortness of breath, fever, or chills. She has not taken any pain medications.    Independent Historian   None    Review of External Notes   I reviewed the clinic note from 10/4/24.    Past Medical History     Medical History and Problem List   Endometriosis  Asthma  Hyperlipidemia  Tobacco abuse    Medications   The patient is not currently taking any regular medications.     Surgical History   Appendectomy  Excise cyst breast  Total abdominal hysterectomy   Tonsillectomy     Physical Exam     Patient Vitals for the past 24 hrs:   BP Temp Temp src Pulse Resp SpO2 Height Weight   06/10/25 1145 103/71 98  F (36.7  C) Temporal 85 16 98 % 1.626 m (5' 4\") 59 kg (130 lb)     Physical Exam  General: No acute distress  Head: No obvious trauma to head.  Ears, Nose, Throat:  External ears normal.  Nose normal.  No pharyngeal erythema, swelling or exudate.  Midline uvula. Moist mucus membranes.  Eyes:  Conjunctivae clear.   Neck: Normal range of motion.  Neck supple.   CV: Regular rate and rhythm.  No murmurs.      Respiratory: Effort normal and breath sounds normal.  No wheezing or crackles.   Gastrointestinal: Soft.  No distension. Diffuse lower abdominal tenderness " to palpation.  There is no rigidity, no rebound and no guarding.   Musculoskeletal: Normal range of motion.  Non tender extremities to palpations. No lower extremity edema  Neuro: Alert. Moving all extremities appropriately.  Normal speech.    Skin: Skin is warm and dry.  No rash noted.   Psych: Normal mood and affect. Behavior is normal.     Diagnostics     Lab Results   Labs Ordered and Resulted from Time of ED Arrival to Time of ED Departure   CBC WITH PLATELETS AND DIFFERENTIAL - Abnormal       Result Value    WBC Count 11.4 (*)     RBC Count 4.52      Hemoglobin 14.2      Hematocrit 40.1      MCV 89      MCH 31.4      MCHC 35.4      RDW 13.5      Platelet Count 295      % Neutrophils 65      % Lymphocytes 26      % Monocytes 7      % Eosinophils 1      % Basophils 1      % Immature Granulocytes 0      NRBCs per 100 WBC 0      Absolute Neutrophils 7.4      Absolute Lymphocytes 2.9      Absolute Monocytes 0.8      Absolute Eosinophils 0.1      Absolute Basophils 0.1      Absolute Immature Granulocytes 0.0      Absolute NRBCs 0.0     COMPREHENSIVE METABOLIC PANEL - Normal    Sodium 138      Potassium 3.9      Carbon Dioxide (CO2) 23      Anion Gap 12      Urea Nitrogen 8.3      Creatinine 0.70      GFR Estimate >90      Calcium 9.4      Chloride 103      Glucose 96      Alkaline Phosphatase 56      AST 15      ALT 13      Protein Total 6.5      Albumin 4.1      Bilirubin Total 0.6         Imaging   CT Abdomen Pelvis w Contrast   Final Result   IMPRESSION:    1.  Evidence of nonspecific colitis involving the descending and sigmoid colon, likely infectious/inflammatory.   2.  Mild bladder wall thickening. Recommend correlation with urinalysis to exclude acute cystitis.   3.  Colonic diverticulosis.   4.  Small amount of free fluid in the pelvis.          Independent Interpretation   None    ED Course      Medications Administered   Medications   oxyCODONE (ROXICODONE) tablet 5 mg (5 mg Oral $Given 6/10/25 1234)    sodium chloride 0.9 % bag for CT scan flush (60 mLs Intravenous $Given 6/10/25 1328)   iopamidol (ISOVUE-370) solution 65 mL (65 mLs Intravenous $Given 6/10/25 1328)       Procedures   Procedures     Discussion of Management   None    ED Course   ED Course as of 06/10/25 2332   Tue Francis 10, 2025   1250 I obtained the history and performed the examination as described.        Additional Documentation  None    Medical Decision Making / Diagnosis     CMS Diagnoses: None    MIPS   None               MDM   Laurylee Duscher is a 59 year old female presenting with lower abdominal pain. She also endorses some bloody stool. She is not anemic. CBC, CMP are grossly unremarkable. She is given oxycodone for pain.  CT scan shows colonic wall thickening, consistent with colitis.  Upon reevaluation, she reports her pain has resolved.  She is encouraged to drink plenty of fluids to stay hydrated and eat a bland diet over the coming days.  She is given a prescription for a few tablets of oxycodone and Zofran.  She is instructed to follow-up with her primary care provider.  Return precautions are given and she verbalizes understanding.  She is discharged home in stable condition.    Disposition   The patient was discharged.     Diagnosis     ICD-10-CM    1. Colitis  K52.9            Discharge Medications   Discharge Medication List as of 6/10/2025  3:18 PM        START taking these medications    Details   ondansetron (ZOFRAN ODT) 4 MG ODT tab Take 1 tablet (4 mg) by mouth every 8 hours as needed for nausea., Disp-6 tablet, R-0, E-Prescribe      oxyCODONE (ROXICODONE) 5 MG tablet Take 1 tablet (5 mg) by mouth every 6 hours as needed for pain., Disp-3 tablet, R-0, E-Prescribe               Scribe Disclosure:  I, Darrion Chaidez, am serving as a scribe at 1:37 PM on 6/10/2025 to document services personally performed by Alexander Kelly MD based on my observations and the provider's statements to me.        Alexander Kelly MD  06/10/25  [id number]

## 2025-06-10 NOTE — ED TRIAGE NOTES
Pt started having abdominal pain yesterday morning with loose stools and small amount of blood x 5 times since. The pain ranges 1 to 9, comes in waves. Nauseated, last intake last night 10pm, yogurt. Pt. States she feels exhausted.      Triage Assessment (Adult)       Row Name 06/10/25 1149          Triage Assessment    Airway WDL WDL        Respiratory WDL    Respiratory WDL WDL        Skin Circulation/Temperature WDL    Skin Circulation/Temperature WDL WDL        Cardiac WDL    Cardiac WDL WDL        Peripheral/Neurovascular WDL    Peripheral Neurovascular WDL WDL        Cognitive/Neuro/Behavioral WDL    Cognitive/Neuro/Behavioral WDL WDL

## 2025-06-10 NOTE — DISCHARGE INSTRUCTIONS
Your blood work appears normal.  Your CT scan shows evidence of inflammation of the colon, called colitis.  Be sure that you are drinking plenty of water to stay hydrated.  We recommend you try Tylenol and ibuprofen for pain.  We are giving you a prescription for a few tablets of pain pills and nausea medication to be taken as needed.  Please return to the emergency department if you develop any new or concerning symptoms.